# Patient Record
(demographics unavailable — no encounter records)

---

## 2025-03-28 NOTE — HISTORY OF PRESENT ILLNESS
[de-identified] : This is very nice 66-year-old gentleman experiencing bilateral knee and right hip pain, which is mild in intensity.  He had much more pain several days ago but today he has minimal pain.  He noted atraumatic swelling in the right knee.  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  He is severe atrophy of the right leg.  He cannot take NSAIDs because he is on Eliquis.  The pain does not limits activities of daily living. Walking tolerance is reduced but that is because of his postpolio syndrome. The patient denies any radiation of the pain to the feet and it is not associated with numbness, tingling, or weakness.

## 2025-03-28 NOTE — PHYSICAL EXAM
[de-identified] : Patient is well nourished, well-developed, in no acute distress, with appropriate mood and affect. The patient is oriented to time, place, and person. Respirations are even and unlabored. Gait evaluation does not reveal a limp. There is no inguinal adenopathy. Examination of the contralateral hip shows normal range of motion, strength, no tenderness, and intact skin. The right limb is well-perfused and showed 2+ dp/pt pulses, without skin lesions, shows a grossly normal motor and sensory examination although there is obvious atrophy of the right lower extremity compared to the left. Examination of the hip shows no skin lesions. Hip motion is full and painless from 0-90 degrees extension to flexion, 20 degrees adduction and 20 degrees abduction, and 15 degrees internal and 30 degrees external rotation. Leg lengths are approximately equal. FADIR is negative and HARPER is negative. Stinchfield test is negative. Both hips are stable and muscle strength is normal with good strength with resisted abduction and adduction.  Right knee motion is painless and the knee moves from 0 to 135 degrees. The knee is stable within that range-of-motion to AP and ML stress with a 1A Lachman, negative anterior or posterior drawer and no instability to varus or valgus stress. The alignment of the knee is 5 degrees varus.  No crepitus is noted.  1+ effusion.  No tenderness to palpation about the medial or lateral joint line, medial or lateral tibial plateau, medial or lateral femoral condyle, medial or lateral patellar facets, superior or inferior pole of the patella. Nick's is negative. Muscle strength is normal. Pedal pulses are palpable. Hip examination was negative. The left limb is well-perfused and showed 2+ dp/pt pulses, without skin lesions, shows a grossly normal motor and sensory examination. Left knee motion is painless and the knee moves from 0 to 135 degrees. The knee is stable within that range-of-motion to AP and ML stress with a 1A Lachman, negative anterior or posterior drawer and no instability to varus or valgus stress. The alignment of the knee is 5 degrees varus. No effusion or crepitus is noted. No tenderness to palpation about the medial or lateral joint line, medial or lateral tibial plateau, medial or lateral femoral condyle, medial or lateral patellar facets, superior or inferior pole of the patella. Nick's is negative. Muscle strength is normal. Pedal pulses are palpable. Hip examination was negative. [de-identified] : AP pelvis, AP and lateral hip radiographs of the right hip were ordered and taken in the office and demonstrate no evidence of degenerative joint disease of the hip with maintained joint space and no evidence of fractures or other intraarticular pathology.  Long standing knee, AP knee, lateral knee, and patellar views of the bilateral knee were ordered and taken in the office and demonstrate no evidence of degenerative joint disease of the knee, fractures, intra-articular pathology.

## 2025-03-28 NOTE — DISCUSSION/SUMMARY
[de-identified] : This patient has sequelae secondary to post polio syndrome and a normal right hip and normal bilateral knees.  He does have a mild effusion in the knee which she says is his baseline.  He is minimally symptomatic today.  The patient is not an appropriate candidate for surgical intervention at this time. An extensive discussion was conducted on the natural history of the disease and the variety of surgical and non-surgical options available to the patient including, but not limited to non-steroidal anti-inflammatory medications, steroid injections, physical therapy, maintenance of ideal body weight, and reduction of activity.  The patient will schedule an appointment as needed.

## 2025-03-28 NOTE — HISTORY OF PRESENT ILLNESS
[de-identified] : This is very nice 66-year-old gentleman experiencing bilateral knee and right hip pain, which is mild in intensity.  He had much more pain several days ago but today he has minimal pain.  He noted atraumatic swelling in the right knee.  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  He is severe atrophy of the right leg.  He cannot take NSAIDs because he is on Eliquis.  The pain does not limits activities of daily living. Walking tolerance is reduced but that is because of his postpolio syndrome. The patient denies any radiation of the pain to the feet and it is not associated with numbness, tingling, or weakness.

## 2025-03-28 NOTE — DISCUSSION/SUMMARY
[de-identified] : This patient has sequelae secondary to post polio syndrome and a normal right hip and normal bilateral knees.  He does have a mild effusion in the knee which she says is his baseline.  He is minimally symptomatic today.  The patient is not an appropriate candidate for surgical intervention at this time. An extensive discussion was conducted on the natural history of the disease and the variety of surgical and non-surgical options available to the patient including, but not limited to non-steroidal anti-inflammatory medications, steroid injections, physical therapy, maintenance of ideal body weight, and reduction of activity.  The patient will schedule an appointment as needed.

## 2025-03-28 NOTE — PHYSICAL EXAM
[de-identified] : Patient is well nourished, well-developed, in no acute distress, with appropriate mood and affect. The patient is oriented to time, place, and person. Respirations are even and unlabored. Gait evaluation does not reveal a limp. There is no inguinal adenopathy. Examination of the contralateral hip shows normal range of motion, strength, no tenderness, and intact skin. The right limb is well-perfused and showed 2+ dp/pt pulses, without skin lesions, shows a grossly normal motor and sensory examination although there is obvious atrophy of the right lower extremity compared to the left. Examination of the hip shows no skin lesions. Hip motion is full and painless from 0-90 degrees extension to flexion, 20 degrees adduction and 20 degrees abduction, and 15 degrees internal and 30 degrees external rotation. Leg lengths are approximately equal. FADIR is negative and HARPER is negative. Stinchfield test is negative. Both hips are stable and muscle strength is normal with good strength with resisted abduction and adduction.  Right knee motion is painless and the knee moves from 0 to 135 degrees. The knee is stable within that range-of-motion to AP and ML stress with a 1A Lachman, negative anterior or posterior drawer and no instability to varus or valgus stress. The alignment of the knee is 5 degrees varus.  No crepitus is noted.  1+ effusion.  No tenderness to palpation about the medial or lateral joint line, medial or lateral tibial plateau, medial or lateral femoral condyle, medial or lateral patellar facets, superior or inferior pole of the patella. Nick's is negative. Muscle strength is normal. Pedal pulses are palpable. Hip examination was negative. The left limb is well-perfused and showed 2+ dp/pt pulses, without skin lesions, shows a grossly normal motor and sensory examination. Left knee motion is painless and the knee moves from 0 to 135 degrees. The knee is stable within that range-of-motion to AP and ML stress with a 1A Lachman, negative anterior or posterior drawer and no instability to varus or valgus stress. The alignment of the knee is 5 degrees varus. No effusion or crepitus is noted. No tenderness to palpation about the medial or lateral joint line, medial or lateral tibial plateau, medial or lateral femoral condyle, medial or lateral patellar facets, superior or inferior pole of the patella. iNck's is negative. Muscle strength is normal. Pedal pulses are palpable. Hip examination was negative. [de-identified] : AP pelvis, AP and lateral hip radiographs of the right hip were ordered and taken in the office and demonstrate no evidence of degenerative joint disease of the hip with maintained joint space and no evidence of fractures or other intraarticular pathology.  Long standing knee, AP knee, lateral knee, and patellar views of the bilateral knee were ordered and taken in the office and demonstrate no evidence of degenerative joint disease of the knee, fractures, intra-articular pathology.